# Patient Record
Sex: MALE | Race: WHITE | NOT HISPANIC OR LATINO
[De-identification: names, ages, dates, MRNs, and addresses within clinical notes are randomized per-mention and may not be internally consistent; named-entity substitution may affect disease eponyms.]

---

## 2017-05-02 ENCOUNTER — LABORATORY RESULT (OUTPATIENT)
Age: 37
End: 2017-05-02

## 2017-05-02 ENCOUNTER — APPOINTMENT (OUTPATIENT)
Dept: POPULATION HEALTH | Facility: CLINIC | Age: 37
End: 2017-05-02

## 2017-05-02 VITALS
WEIGHT: 169 LBS | HEIGHT: 69 IN | OXYGEN SATURATION: 100 % | HEART RATE: 50 BPM | SYSTOLIC BLOOD PRESSURE: 120 MMHG | DIASTOLIC BLOOD PRESSURE: 80 MMHG | TEMPERATURE: 97.5 F | RESPIRATION RATE: 15 BRPM | BODY MASS INDEX: 25.03 KG/M2

## 2017-05-02 DIAGNOSIS — Z77.098 CONTACT WITH AND (SUSPECTED) EXPOSURE TO OTHER HAZARDOUS, CHIEFLY NONMEDICINAL, CHEMICALS: ICD-10-CM

## 2017-05-02 DIAGNOSIS — I10 ESSENTIAL (PRIMARY) HYPERTENSION: ICD-10-CM

## 2017-05-02 DIAGNOSIS — L30.9 DERMATITIS, UNSPECIFIED: ICD-10-CM

## 2017-05-02 DIAGNOSIS — Z80.42 FAMILY HISTORY OF MALIGNANT NEOPLASM OF PROSTATE: ICD-10-CM

## 2017-05-02 RX ORDER — PROPRANOLOL HYDROCHLORIDE 40 MG/1
40 TABLET ORAL
Refills: 0 | Status: ACTIVE | COMMUNITY

## 2022-08-11 ENCOUNTER — APPOINTMENT (OUTPATIENT)
Dept: ORTHOPEDIC SURGERY | Facility: CLINIC | Age: 42
End: 2022-08-11

## 2022-08-11 VITALS — HEIGHT: 68 IN | BODY MASS INDEX: 25.01 KG/M2 | WEIGHT: 165 LBS

## 2022-08-11 DIAGNOSIS — Z78.9 OTHER SPECIFIED HEALTH STATUS: ICD-10-CM

## 2022-08-11 DIAGNOSIS — Z00.00 ENCOUNTER FOR GENERAL ADULT MEDICAL EXAMINATION W/OUT ABNORMAL FINDINGS: ICD-10-CM

## 2022-08-11 PROCEDURE — 73630 X-RAY EXAM OF FOOT: CPT | Mod: RT

## 2022-08-11 PROCEDURE — 99204 OFFICE O/P NEW MOD 45 MIN: CPT

## 2022-08-11 PROCEDURE — L4361: CPT | Mod: RT

## 2022-08-11 RX ORDER — NAPROXEN 500 MG/1
500 TABLET ORAL
Qty: 60 | Refills: 0 | Status: ACTIVE | COMMUNITY
Start: 2022-08-11 | End: 1900-01-01

## 2022-08-11 NOTE — ASSESSMENT
[FreeTextEntry1] : Due to chronicity and significant weakness with eversion, MRI RT ankle to evaluate peroneal tendon partial tear.\par WBAT in CAM boot.\par \par Patient was instructed that they can not operate an automatic vehicle while wearing a CAM boot or cast on the right lower extremity. If operating a vehicle that requires use of a clutch, patient may not drive while wearing a CAM boot or cast on the left lower extremity.\par \par Ice to affected area.\par \par The patient was explained the options as well as benefits of over the counter verses prescription strength nonsteroidal anti-inflammatory medication. The patient opts for a prescription strength medication.\par \par

## 2022-08-11 NOTE — PHYSICAL EXAM
[NL (40)] : plantar flexion 40 degrees [5___] : [facility] 5[unfilled]/5 [2+] : posterior tibialis pulse: 2+ [Normal] : saphenous nerve sensation normal [Right] : right foot [Weight -] : weightbearing [There are no fractures, subluxations or dislocations. No significant abnormalities are seen] : There are no fractures, subluxations or dislocations. No significant abnormalities are seen [4___] : eversion 4[unfilled]/5 [] : mildly antalgic [de-identified] : inversion 15 degrees [de-identified] : eversion 20 degrees [TWNoteComboBox7] : dorsiflexion 5 degrees

## 2022-08-11 NOTE — HISTORY OF PRESENT ILLNESS
[Result of repetitive motion] : result of repetitive motion [7] : 7 [5] : 5 [Burning] : burning [Shooting] : shooting [Stabbing] : stabbing [Full time] : Work status: full time [de-identified] : Pt. is a 41 year old male who presents for evaluation of his RT foot/ankle. Reports he felt severe pain while running Fall 2021. He has tried resting without significant relief. Ran last week and had pain. Has tried NSAIDS and ice to affected area. H/O LT peroneal brevis/longus tenosynovectomy with brevis repair and calcaneal osteotomy 2011 which is doing very well. WB in [facility]'s.  [] : no [FreeTextEntry1] : rt ankle [FreeTextEntry5] : 8 months, pt sprained ankle while running preparing for half marathon.  [FreeTextEntry9] : elevation and icing 45 min prior bed [de-identified] : running [de-identified] : Dr. Oropeza [de-identified] :  [de-identified] : from home

## 2022-08-19 ENCOUNTER — RESULT REVIEW (OUTPATIENT)
Age: 42
End: 2022-08-19

## 2022-09-01 ENCOUNTER — APPOINTMENT (OUTPATIENT)
Dept: ORTHOPEDIC SURGERY | Facility: CLINIC | Age: 42
End: 2022-09-01

## 2022-09-01 PROCEDURE — 99214 OFFICE O/P EST MOD 30 MIN: CPT

## 2022-09-01 NOTE — PHYSICAL EXAM
[NL (40)] : plantar flexion 40 degrees [4___] : eversion 4[unfilled]/5 [5___] : [facility] 5[unfilled]/5 [2+] : posterior tibialis pulse: 2+ [Normal] : saphenous nerve sensation normal [Right] : right foot [Weight -] : weightbearing [There are no fractures, subluxations or dislocations. No significant abnormalities are seen] : There are no fractures, subluxations or dislocations. No significant abnormalities are seen [] : no pain when stressing lateral tarsal metatarsal joint [de-identified] : inversion 15 degrees [de-identified] : eversion 20 degrees [TWNoteComboBox7] : dorsiflexion 5 degrees

## 2022-09-01 NOTE — ASSESSMENT
[FreeTextEntry1] : Discussed treatment options, both operative and non-operative.\par Surgically, he was discussed a peroneal tendon tenoylsis/tenosynovectomy with repair and calcaneal osteotomy.\par \par The risks and benefits of surgery have been discussed. Risks include but are not limited to bleeding, infection, reaction to anesthesia, injury to blood vessels and nerves, malunion, nonunion, necessity of repeat surgery, chronic pain, loss of limb and death. The patient understands the risks and agrees with the surgical plan. Details of the procedure and postoperative protocol were discussed at length. All questions have been answered.\par \par Pt. opts for surgery and will be scheduled at his convenience.

## 2022-09-01 NOTE — HISTORY OF PRESENT ILLNESS
[Result of repetitive motion] : result of repetitive motion [7] : 7 [5] : 5 [Burning] : burning [Shooting] : shooting [Stabbing] : stabbing [Full time] : Work status: full time [de-identified] : Pt. is a 41 year old male who presents for f/u of his RT foot/ankle. Reports he felt severe pain while running Fall 2021. He has tried resting without significant relief. Ran last week and had pain. Has tried NSAIDS and ice to affected area. H/O LT peroneal brevis/longus tenosynovectomy with brevis repair and calcaneal osteotomy 2011 which is doing very well. WB in CAM boot for three weeks without improvement. MRI was consistent with partial tear peroneus brevis. NSAIDS prn not helpful.  [] : no [FreeTextEntry1] : rt ankle [FreeTextEntry5] : 8 months, pt sprained ankle while running preparing for half marathon.  [FreeTextEntry9] : elevation and icing 45 min prior bed [de-identified] : running [de-identified] : Dr. Oropeza [de-identified] :  [de-identified] : from home

## 2022-09-01 NOTE — DATA REVIEWED
[MRI] : MRI [Right] : of the right [Ankle] : ankle [Report was reviewed and noted in the chart] : The report was reviewed and noted in the chart [I independently reviewed and interpreted images and report] : I independently reviewed and interpreted images and report [FreeTextEntry1] : Peroneus brevis split tear.

## 2022-10-06 ENCOUNTER — LABORATORY RESULT (OUTPATIENT)
Age: 42
End: 2022-10-06

## 2022-10-10 ENCOUNTER — RESULT REVIEW (OUTPATIENT)
Age: 42
End: 2022-10-10

## 2022-10-10 RX ORDER — HYDROCODONE BITARTRATE AND ACETAMINOPHEN 7.5; 325 MG/1; MG/1
7.5-325 TABLET ORAL
Qty: 42 | Refills: 0 | Status: ACTIVE | COMMUNITY
Start: 2022-10-10

## 2022-10-11 ENCOUNTER — APPOINTMENT (OUTPATIENT)
Age: 42
End: 2022-10-11

## 2022-10-11 PROCEDURE — 27658 REPAIR OF LEG TENDON EACH: CPT | Mod: 59,RT

## 2022-10-11 PROCEDURE — 27680 RELEASE OF LOWER LEG TENDON: CPT | Mod: 59,RT

## 2022-10-11 PROCEDURE — 28300 INCISION OF HEEL BONE: CPT | Mod: RT

## 2022-10-11 PROCEDURE — 29515 APPLICATION SHORT LEG SPLINT: CPT | Mod: 59,RT

## 2022-10-20 ENCOUNTER — APPOINTMENT (OUTPATIENT)
Dept: ORTHOPEDIC SURGERY | Facility: CLINIC | Age: 42
End: 2022-10-20
Payer: COMMERCIAL

## 2022-10-20 DIAGNOSIS — S86.311A STRAIN OF MUSCLE(S) AND TENDON(S) OF PERONEAL MUSCLE GROUP AT LOWER LEG LEVEL, RIGHT LEG, INITIAL ENCOUNTER: ICD-10-CM

## 2022-10-20 PROCEDURE — 99024 POSTOP FOLLOW-UP VISIT: CPT

## 2022-10-20 PROCEDURE — 29405 APPL SHORT LEG CAST: CPT | Mod: 58,RT

## 2022-10-20 PROCEDURE — 73650 X-RAY EXAM OF HEEL: CPT | Mod: RT

## 2022-10-20 NOTE — ASSESSMENT
"Ward Mckenzie is a 2 y.o. male here for a non-provider visit for:   FLU    Reason for immunization: Annual Flu Vaccine  Immunization records indicate need for vaccine: Yes, confirmed with Epic  Minimum interval has been met for this vaccine: Yes  ABN completed: No    VIS Dated  8/6/21 was given to patient: Yes  All IAC Questionnaire questions were answered \"No.\"    Patient tolerated injection and no adverse effects were observed or reported: Yes    Pt scheduled for next dose in series: No    " [FreeTextEntry1] : Sutures removed \par \par A well padded short leg cast was applied.  Patient was instructed on proper cast management including keeping it dry and not sticking anything down the cast.  Patient was told that if pain significantly increases or toes turn numb and/or blue and simple elevation does not allow symptoms to improve in a few minutes, to call the office immediately or go to the ER.  All questions were answered. \par \par Repeat x-ray (calcaneus) will be performed at the next office visit (out of cast).\par

## 2022-10-20 NOTE — HISTORY OF PRESENT ILLNESS
[Result of repetitive motion] : result of repetitive motion [7] : 7 [5] : 5 [Burning] : burning [Shooting] : shooting [Stabbing] : stabbing [Full time] : Work status: full time [de-identified] : Patient returns s/p Right Avendano calcaneal osteotomy, right peroneal brevis tenolysis, right peroneal brevis tendon repair 10/11/22.  he has been nwb in his splint. He reports only mild/intermittent pain at this time. [] : no [FreeTextEntry1] : rt ankle [FreeTextEntry5] : 8 months, pt sprained ankle while running preparing for half marathon.  [FreeTextEntry9] : elevation and icing 45 min prior bed [de-identified] : running [de-identified] : Dr. Oropeza [de-identified] :  [de-identified] : from home [de-identified] : 10/11/22 [de-identified] : Right Avendano calcaneal osteotomy, right peroneal brevis tenolysis, right peroneal brevis tendon repair

## 2022-10-20 NOTE — PHYSICAL EXAM
[Right] : right foot and ankle [4___] : eversion 4[unfilled]/5 [2+] : posterior tibialis pulse: 2+ [Normal] : saphenous nerve sensation normal [5___] : inversion 5[unfilled]/5 [] : no pain when stressing lateral tarsal metatarsal joint [de-identified] : plantar flexion 30 degrees [de-identified] : inversion 20 degrees [de-identified] : eversion 15 degrees [TWNoteComboBox7] : dorsiflexion 5 degrees

## 2022-11-10 ENCOUNTER — APPOINTMENT (OUTPATIENT)
Dept: ORTHOPEDIC SURGERY | Facility: CLINIC | Age: 42
End: 2022-11-10

## 2022-11-10 VITALS — HEIGHT: 68 IN | BODY MASS INDEX: 25.01 KG/M2 | WEIGHT: 165 LBS

## 2022-11-10 PROCEDURE — 99024 POSTOP FOLLOW-UP VISIT: CPT

## 2022-11-10 PROCEDURE — 73650 X-RAY EXAM OF HEEL: CPT | Mod: RT

## 2022-11-10 NOTE — ASSESSMENT
[FreeTextEntry1] : Patient is doing well.  He can now be wbat in the cam boot he has at home.  Home ROM exercises will start.  Rx for PT given.\par \par Repeat x-ray (calcaneus) will be performed at the next office visit (out of cast).\par

## 2022-11-10 NOTE — PHYSICAL EXAM
[5___] : dorsiflexion 5[unfilled]/5 [2+] : posterior tibialis pulse: 2+ [Normal] : saphenous nerve sensation normal [4___] : inversion 4[unfilled]/5 [Decreased] : sural nerve sensation decreased [] : ambulation with crutches [Right] : right calcaneus [No loss of surgical correlation. Bony alignment acceptable. Hardware in appropriate position] : No loss of surgical correlation. Bony alignment acceptable. Hardware in appropriate position [FreeTextEntry3] : Clean,dry eschars at incision sites, with no sign of infection. [de-identified] : Osteotomy nearly healed with no loss of alignment. [de-identified] : plantar flexion 30 degrees [de-identified] : inversion 20 degrees [de-identified] : eversion 15 degrees [TWNoteComboBox7] : dorsiflexion 10 degrees

## 2022-11-10 NOTE — REVIEW OF SYSTEMS
[Joint Pain] : joint pain [Negative] : Heme/Lymph [Joint Stiffness] : no joint stiffness [Joint Swelling] : no joint swelling [FreeTextEntry9] : itchy

## 2022-11-10 NOTE — HISTORY OF PRESENT ILLNESS
[Result of repetitive motion] : result of repetitive motion [Full time] : Work status: full time [0] : 0 [Localized] : localized [Tightness] : tightness [de-identified] : Patient returns s/p Right Avendano calcaneal osteotomy, right peroneal brevis tenolysis, right peroneal brevis tendon repair 10/11/22.  he has been nwb in his S[facility].  He reports no pain at this time. [] : no [FreeTextEntry1] : rt ankle [FreeTextEntry5] : 8 months, pt sprained ankle while running preparing for half marathon.  [FreeTextEntry9] : elevation and icing 45 min prior bed [de-identified] : running [de-identified] : 10-11-22 [de-identified] :  [de-identified] : from home [de-identified] : 10/11/22 [de-identified] : Right Avendano calcaneal osteotomy, right peroneal brevis tenolysis, right peroneal brevis tendon repair

## 2022-12-08 ENCOUNTER — APPOINTMENT (OUTPATIENT)
Dept: ORTHOPEDIC SURGERY | Facility: CLINIC | Age: 42
End: 2022-12-08

## 2022-12-08 VITALS — WEIGHT: 165 LBS | BODY MASS INDEX: 25.01 KG/M2 | HEIGHT: 68 IN

## 2022-12-08 PROCEDURE — 99024 POSTOP FOLLOW-UP VISIT: CPT

## 2022-12-08 PROCEDURE — 73650 X-RAY EXAM OF HEEL: CPT | Mod: RT

## 2022-12-08 NOTE — ASSESSMENT
[FreeTextEntry1] : D/C CAM boot.\par WBAT in supportive footwear. \par Continue with PT and home exercises.\par No high impact activities.

## 2022-12-08 NOTE — HISTORY OF PRESENT ILLNESS
[Result of repetitive motion] : result of repetitive motion [0] : 0 [Localized] : localized [Full time] : Work status: full time [Occasional] : occasional [Physical therapy] : physical therapy [de-identified] : Patient returns s/p Right Avendano calcaneal osteotomy, right peroneal brevis tenolysis, right peroneal brevis tendon repair 10/11/22. He has been WBAT in CAM boot. Doing well. He has been going to PT and doing HEP. He feels balanced when bearing weight and reports his right foot feels like his left foot.  [] : no [FreeTextEntry1] : rt foot/ ankle [FreeTextEntry5] : 1 year ago, pt sprained ankle while running preparing for half marathon.  [FreeTextEntry6] : mild soreness in the AM  [FreeTextEntry9] : elevation and icing 45 min prior bed, HEP  [de-identified] : 10-11-22 [de-identified] : Physical therapy  [de-identified] :  [de-identified] : from home [de-identified] : 10/11/22 [de-identified] : Right Avendano calcaneal osteotomy, right peroneal brevis tenolysis, right peroneal brevis tendon repair

## 2022-12-08 NOTE — PHYSICAL EXAM
[4___] : eversion 4[unfilled]/5 [2+] : posterior tibialis pulse: 2+ [Decreased] : sural nerve sensation decreased [Normal] : saphenous nerve sensation normal [Right] : right calcaneus [No loss of surgical correlation. Bony alignment acceptable. Hardware in appropriate position] : No loss of surgical correlation. Bony alignment acceptable. Hardware in appropriate position [NL (40)] : plantar flexion 40 degrees [NL 30)] : inversion 30 degrees [NL (20)] : eversion 20 degrees [5___] : inversion 5[unfilled]/5 [] : no pain when stressing lateral tarsal metatarsal joint [FreeTextEntry3] : Minimal lateral ankle swelling.\par  [FreeTextEntry9] : ROM is symmetrical.  [de-identified] : WB in CAM boot.  [de-identified] : Osteotomy well healed with no loss of alignment. [de-identified] : plantar flexion 30 degrees [de-identified] : inversion 20 degrees [de-identified] : eversion 15 degrees [TWNoteComboBox7] : dorsiflexion 10 degrees

## 2023-01-05 ENCOUNTER — APPOINTMENT (OUTPATIENT)
Dept: ORTHOPEDIC SURGERY | Facility: CLINIC | Age: 43
End: 2023-01-05
Payer: COMMERCIAL

## 2023-01-05 PROCEDURE — 73610 X-RAY EXAM OF ANKLE: CPT | Mod: RT

## 2023-01-05 PROCEDURE — 99024 POSTOP FOLLOW-UP VISIT: CPT

## 2023-01-05 NOTE — HISTORY OF PRESENT ILLNESS
[Result of repetitive motion] : result of repetitive motion [0] : 0 [Localized] : localized [Occasional] : occasional [Physical therapy] : physical therapy [Full time] : Work status: full time [de-identified] : Patient returns s/p Right Avendano calcaneal osteotomy, right peroneal brevis tenolysis, right peroneal brevis tendon repair 10/11/22. He has been going to PT and doing HEP. He was doing very well until  late December when while getting into the shower he felt as though he reinjured the same ankle. Put himself back in the boot, used crutches for several days.  He reports posterolateral ankle pain (above the level of his lateral hindfoot scar), but no heel pain. Present WB in [facility] today with limp.   [] : no [FreeTextEntry1] : rt foot/ ankle [FreeTextEntry5] : 1 year ago, pt sprained ankle while running preparing for half marathon.  [FreeTextEntry6] : mild soreness in the AM  [FreeTextEntry9] : elevation and icing 45 min prior bed, HEP  [de-identified] : 10-11-22 [de-identified] : Physical therapy  [de-identified] :  [de-identified] : from home [de-identified] : 10/11/22 [de-identified] : Right Avendano calcaneal osteotomy, right peroneal brevis tenolysis, right peroneal brevis tendon repair

## 2023-01-05 NOTE — ASSESSMENT
[FreeTextEntry1] : Patient's symptoms are more proximal in the peroneal tendons compared to the surgical site.\par MRI right ankle ordered to evaluate the peroneal tendons.\par He was advised to be wbat in his cam boot that he has at home.\par Ice/nsaids prn.

## 2023-01-05 NOTE — PHYSICAL EXAM
[NL (40)] : plantar flexion 40 degrees [NL 30)] : inversion 30 degrees [NL (20)] : eversion 20 degrees [2+] : posterior tibialis pulse: 2+ [Decreased] : sural nerve sensation decreased [Normal] : saphenous nerve sensation normal [5___] : dorsiflexion 5[unfilled]/5 [4___] : plantar flexion 4[unfilled]/5 [] : no pain when stressing lateral tarsal metatarsal joint [Right] : right ankle [There are no fractures, subluxations or dislocations. No significant abnormalities are seen] : There are no fractures, subluxations or dislocations. No significant abnormalities are seen [Weight -] : weightbearing [de-identified] : WB in CAM boot.  [FreeTextEntry9] : Calcaneal osteotomy well healed with no loss of alignment. [TWNoteComboBox7] : dorsiflexion 10 degrees

## 2023-01-06 ENCOUNTER — RESULT REVIEW (OUTPATIENT)
Age: 43
End: 2023-01-06

## 2023-01-09 ENCOUNTER — NON-APPOINTMENT (OUTPATIENT)
Age: 43
End: 2023-01-09

## 2023-01-12 ENCOUNTER — APPOINTMENT (OUTPATIENT)
Dept: ORTHOPEDIC SURGERY | Facility: CLINIC | Age: 43
End: 2023-01-12
Payer: COMMERCIAL

## 2023-01-12 VITALS — HEIGHT: 68 IN | BODY MASS INDEX: 25.01 KG/M2 | WEIGHT: 165 LBS

## 2023-01-12 PROCEDURE — 99214 OFFICE O/P EST MOD 30 MIN: CPT

## 2023-01-12 NOTE — PHYSICAL EXAM
[Right] : right foot and ankle [NL (40)] : plantar flexion 40 degrees [4___] : plantar flexion 4[unfilled]/5 [5___] : inversion 5[unfilled]/5 [3___] : eversion 3[unfilled]/5 [2+] : posterior tibialis pulse: 2+ [Decreased] : sural nerve sensation decreased [Normal] : saphenous nerve sensation normal [] : no pain when stressing lateral tarsal metatarsal joint [FreeTextEntry9] : ROM is symmetrical.  [de-identified] : WB in CAM boot.  [de-identified] : inversion 10 degrees [de-identified] : eversion 10 degrees [TWNoteComboBox7] : dorsiflexion 10 degrees

## 2023-01-12 NOTE — ASSESSMENT
[FreeTextEntry1] : Discussed treatment options, both operative and non-operative. \par Surgery is the recommended treatment and he is in agreement.\par \par Discussed RT peroneal repair (with allograft) vs. tendon transfer).\par  \par The risks and benefits of surgery have been discussed. Risks include but are not limited to bleeding, infection, reaction to anesthesia, injury to blood vessels and nerves, malunion, nonunion, necessity of repeat surgery, chronic pain, DVT, PE, loss of limb and death. The patient understands the risks and agrees with the surgical plan. Details of the procedure and postoperative protocol were discussed at length. All questions have been answered.\par \par Continue with CAM boot for comfort. \par

## 2023-01-12 NOTE — DATA REVIEWED
[MRI] : MRI [Right] : of the right [Foot] : foot [Report was reviewed and noted in the chart] : The report was reviewed and noted in the chart [I independently reviewed and interpreted images and report] : I independently reviewed and interpreted images and report [FreeTextEntry1] : [facility] 1/6/23: Status post calcaneal osteotomy with incomplete healing. There is some reactive\par signal alteration surrounding the fixation screws. The findings may reflect\par recent postoperative change. A follow-up examination is recommended to assess\par for complete bony union.\par \par Complete tear of peroneus brevis tendon at the level of distal calcaneus with\par 1.5 cm gap. Proximal and distal segments demonstrate tendinosis associated with\par postoperative changes.\par \par Posterior subtalar joint effusion.

## 2023-01-12 NOTE — HISTORY OF PRESENT ILLNESS
[Dull/Aching] : dull/aching [Sharp] : sharp [Stabbing] : stabbing [Throbbing] : throbbing [Nothing helps with pain getting better] : Nothing helps with pain getting better [Full time] : Work status: full time [Result of repetitive motion] : result of repetitive motion [0] : 0 [Localized] : localized [Occasional] : occasional [Physical therapy] : physical therapy [de-identified] : Patient returns s/p Right Avendano calcaneal osteotomy, right peroneal brevis tenolysis, right peroneal brevis tendon repair 10/11/22. He has been going to PT and doing HEP. He was doing very well until  late December when while getting into the shower he felt as though he reinjured the same ankle. He continues to WB in CAM boot, went for updated MRI which reveals rupture of peroneus brevis tendon.  [] : no [FreeTextEntry1] : rt foot/ ankle [FreeTextEntry5] : 1 year ago, pt sprained ankle while running preparing for half marathon.  [FreeTextEntry6] : mild soreness in the AM  [FreeTextEntry9] : elevation and icing 45 min prior bed, HEP  [de-identified] : PM with no boot [de-identified] : 10-11-22 [de-identified] : Physical therapy  [de-identified] :  [de-identified] : from home [de-identified] : 10/11/22 [de-identified] : Right Avendano calcaneal osteotomy, right peroneal brevis tenolysis, right peroneal brevis tendon repair

## 2023-01-13 ENCOUNTER — LABORATORY RESULT (OUTPATIENT)
Age: 43
End: 2023-01-13

## 2023-01-16 RX ORDER — HYDROCODONE BITARTRATE AND ACETAMINOPHEN 7.5; 325 MG/1; MG/1
7.5-325 TABLET ORAL
Qty: 42 | Refills: 0 | Status: ACTIVE | COMMUNITY
Start: 2023-01-16

## 2023-01-17 ENCOUNTER — APPOINTMENT (OUTPATIENT)
Age: 43
End: 2023-01-17
Payer: COMMERCIAL

## 2023-01-17 ENCOUNTER — RESULT REVIEW (OUTPATIENT)
Age: 43
End: 2023-01-17

## 2023-01-17 PROCEDURE — 27691 REVISE LOWER LEG TENDON: CPT | Mod: RT

## 2023-01-17 PROCEDURE — 29515 APPLICATION SHORT LEG SPLINT: CPT | Mod: 59,RT

## 2023-01-26 ENCOUNTER — APPOINTMENT (OUTPATIENT)
Dept: ORTHOPEDIC SURGERY | Facility: CLINIC | Age: 43
End: 2023-01-26
Payer: COMMERCIAL

## 2023-01-26 VITALS — WEIGHT: 165 LBS | HEIGHT: 68 IN | BODY MASS INDEX: 25.01 KG/M2

## 2023-01-26 PROCEDURE — 29515 APPLICATION SHORT LEG SPLINT: CPT

## 2023-01-26 PROCEDURE — 99024 POSTOP FOLLOW-UP VISIT: CPT

## 2023-01-26 NOTE — HISTORY OF PRESENT ILLNESS
[Result of repetitive motion] : result of repetitive motion [1] : 2 [Dull/Aching] : dull/aching [Localized] : localized [Throbbing] : throbbing [Occasional] : occasional [Sleep] : sleep [Physical therapy] : physical therapy [Nothing helps with pain getting better] : Nothing helps with pain getting better [Lying in bed] : lying in bed [Full time] : Work status: full time [de-identified] : Patient returns s/p Right Avendano calcaneal osteotomy, right peroneal brevis tenolysis, right peroneal brevis tendon repair 10/11/22. He has been going to PT and doing HEP. He was doing very well until  late December when while getting into the shower he felt as though he reinjured the same ankle. He went for updated MRI which revealed rupture of peroneus brevis tendon. Most recently he is s/p RT peroneal brevis to longus tendon transfer 1/17/23. He has been NWB in splint. Post-op pain well managed. No fevers, chills, sweats.  [] : no [FreeTextEntry1] : rt foot/ ankle [FreeTextEntry5] : 1 year ago, pt sprained ankle while running preparing for half marathon.  [FreeTextEntry6] : mild soreness in the AM  [FreeTextEntry9] : elevation and icing 45 min prior bed, HEP  [de-identified] : PM with no boot [de-identified] : 1-17-23 [de-identified] :  [de-identified] : from home [de-identified] : 1-17-23

## 2023-01-26 NOTE — PHYSICAL EXAM
[Right] : right foot and ankle [NL (40)] : plantar flexion 40 degrees [4___] : plantar flexion 4[unfilled]/5 [5___] : inversion 5[unfilled]/5 [3___] : eversion 3[unfilled]/5 [2+] : posterior tibialis pulse: 2+ [Decreased] : sural nerve sensation decreased [Normal] : saphenous nerve sensation normal [] : ambulation with crutches [FreeTextEntry8] : Mild tenderness over incision.  [FreeTextEntry9] : There is active eversion.  [de-identified] : inversion 15 degrees [de-identified] : eversion 15 degrees [TWNoteComboBox7] : dorsiflexion 5 degrees

## 2023-01-26 NOTE — ASSESSMENT
[FreeTextEntry1] : Sutures removed.\par Dressing applied.\par \par NWB in CAM boot.\par \par Patient was instructed that they can not operate an automatic vehicle while wearing a CAM boot or cast on the right lower extremity. If operating a vehicle that requires use of a clutch, patient may not drive while wearing a CAM boot or cast on the left lower extremity.\par \par Several times a day, he will do NWB ankle ROM exercises, focusing on eversion. \par \par Will place in splint, can transition into boot he has at home.

## 2023-02-07 NOTE — REVIEW OF SYSTEMS
Detail Level: Detailed [Joint Pain] : joint pain [Negative] : Heme/Lymph [Joint Stiffness] : joint stiffness [Joint Swelling] : joint swelling

## 2023-02-23 ENCOUNTER — APPOINTMENT (OUTPATIENT)
Dept: ORTHOPEDIC SURGERY | Facility: CLINIC | Age: 43
End: 2023-02-23
Payer: COMMERCIAL

## 2023-02-23 PROCEDURE — 99024 POSTOP FOLLOW-UP VISIT: CPT

## 2023-02-23 NOTE — PHYSICAL EXAM
[Right] : right foot and ankle [2+] : posterior tibialis pulse: 2+ [Decreased] : sural nerve sensation decreased [Normal] : saphenous nerve sensation normal [5___] : plantar flexion 5[unfilled]/5 [4___] : eversion 4[unfilled]/5 [] : no pain when stressing lateral tarsal metatarsal joint [FreeTextEntry3] : Small pinpoint opening proximal aspect of incision, no erythema or drainage noted.  [FreeTextEntry8] : Mild tenderness over incision.  [FreeTextEntry9] : There is active eversion.  [de-identified] : plantar flexion 30 degrees [de-identified] : inversion 10 degrees [de-identified] : eversion 15 degrees [TWNoteComboBox7] : dorsiflexion 15 degrees

## 2023-02-23 NOTE — HISTORY OF PRESENT ILLNESS
[Result of repetitive motion] : result of repetitive motion [Dull/Aching] : dull/aching [Localized] : localized [Throbbing] : throbbing [Occasional] : occasional [Sleep] : sleep [Physical therapy] : physical therapy [Nothing helps with pain getting better] : Nothing helps with pain getting better [Lying in bed] : lying in bed [Full time] : Work status: full time [2] : 2 [0] : 0 [de-identified] : Patient returns s/p Right Avendano calcaneal osteotomy, right peroneal brevis tenolysis, right peroneal brevis tendon repair 10/11/22. He has been going to PT and doing HEP. He was doing very well until  late December when while getting into the shower he felt as though he reinjured the same ankle. He went for updated MRI which revealed rupture of peroneus brevis tendon. Most recently he is s/p RT peroneal brevis to longus tendon transfer 1/17/23. He has been NWB in CAM boot. Doing well.   [] : no [FreeTextEntry1] : rt foot/ ankle [FreeTextEntry5] : 1 year ago, pt sprained ankle while running preparing for half marathon.  [FreeTextEntry6] : mild soreness in the AM  [FreeTextEntry9] : elevation and icing 45 min prior bed, HEP  [de-identified] : PM with no boot [de-identified] : 1-17-23 [de-identified] : cam boot , crutches [de-identified] :  [de-identified] : from home [de-identified] : 1-17-23

## 2023-02-23 NOTE — ASSESSMENT
[FreeTextEntry1] : NWB in CAM boot for one more week and then can transition to WB in CAM boot.\par \par Patient was instructed that they can not operate an automatic vehicle while wearing a CAM boot or cast on the right lower extremity. If operating a vehicle that requires use of a clutch, patient may not drive while wearing a CAM boot or cast on the left lower extremity.\par \par Begin PT in one week. no passive inversion.\par Ice to affected area.\par

## 2023-04-13 ENCOUNTER — APPOINTMENT (OUTPATIENT)
Dept: ORTHOPEDIC SURGERY | Facility: CLINIC | Age: 43
End: 2023-04-13
Payer: COMMERCIAL

## 2023-04-13 VITALS — BODY MASS INDEX: 25.01 KG/M2 | HEIGHT: 68 IN | WEIGHT: 165 LBS

## 2023-04-13 PROCEDURE — 99024 POSTOP FOLLOW-UP VISIT: CPT

## 2023-04-13 PROCEDURE — L1902: CPT | Mod: RT

## 2023-04-13 NOTE — ASSESSMENT
[FreeTextEntry1] : D/C CAM boot.\par WBAT in supportive sneaker, ASO brace.\par Continue PT.\par NSAIDS, ice, activity modification.

## 2023-04-13 NOTE — PHYSICAL EXAM
[Right] : right foot and ankle [5___] : inversion 5[unfilled]/5 [2+] : posterior tibialis pulse: 2+ [Decreased] : sural nerve sensation decreased [Normal] : saphenous nerve sensation normal [NL (40)] : plantar flexion 40 degrees [NL 30)] : inversion 30 degrees [NL (20)] : eversion 20 degrees [4___] : plantar flexion 4[unfilled]/5 [] : pain when isolating peroneal longus tendon [FreeTextEntry3] : Minimal lateral ankle swelling.\par  [de-identified] : WB in CAM boot.  [de-identified] : plantar flexion 30 degrees [de-identified] : inversion 10 degrees [de-identified] : eversion 15 degrees [TWNoteComboBox7] : dorsiflexion 15 degrees

## 2023-04-13 NOTE — HISTORY OF PRESENT ILLNESS
[Result of repetitive motion] : result of repetitive motion [2] : 2 [0] : 0 [Burning] : burning [Radiating] : radiating [Shooting] : shooting [Occasional] : occasional [Sleep] : sleep [Physical therapy] : physical therapy [Nothing helps with pain getting better] : Nothing helps with pain getting better [Lying in bed] : lying in bed [Full time] : Work status: full time [de-identified] : Patient returns s/p Right Avendano calcaneal osteotomy, right peroneal brevis tenolysis, right peroneal brevis tendon repair 10/11/22. He has been going to PT and doing HEP. He was doing very well until  late December when while getting into the shower he felt as though he reinjured the same ankle. He went for updated MRI which revealed rupture of peroneus brevis tendon. Most recently he is s/p RT peroneal brevis to longus tendon transfer 1/17/23. He has been WB in CAM boot, doing PT. He reports continued improvement, there is still some intermittent lateral ankle pain.  [] : no [FreeTextEntry1] : rt foot/ ankle [FreeTextEntry5] : 1 year ago, pt sprained ankle while running preparing for half marathon.  [FreeTextEntry9] : elevation and icing 45 min prior bed, HEP  [de-identified] : standing with no boot [de-identified] : 1-17-23 [de-identified] : cam boot helping [de-identified] :  [de-identified] : from home [de-identified] : 1-17-23

## 2023-04-13 NOTE — REVIEW OF SYSTEMS
[Joint Pain] : joint pain [Joint Swelling] : joint swelling [Negative] : Heme/Lymph [Joint Stiffness] : no joint stiffness [FreeTextEntry9] : nerve pain

## 2023-05-17 ENCOUNTER — APPOINTMENT (OUTPATIENT)
Dept: ORTHOPEDIC SURGERY | Facility: CLINIC | Age: 43
End: 2023-05-17
Payer: COMMERCIAL

## 2023-05-17 VITALS — WEIGHT: 170 LBS | HEIGHT: 68 IN | BODY MASS INDEX: 25.76 KG/M2

## 2023-05-17 PROCEDURE — 99214 OFFICE O/P EST MOD 30 MIN: CPT

## 2023-05-17 RX ORDER — METHYLPREDNISOLONE 4 MG/1
4 TABLET ORAL
Qty: 1 | Refills: 1 | Status: ACTIVE | COMMUNITY
Start: 2023-05-17 | End: 1900-01-01

## 2023-05-17 NOTE — PHYSICAL EXAM
[Right] : right foot and ankle [NL (40)] : plantar flexion 40 degrees [NL 30)] : inversion 30 degrees [NL (20)] : eversion 20 degrees [5___] : inversion 5[unfilled]/5 [4___] : eversion 4[unfilled]/5 [2+] : posterior tibialis pulse: 2+ [Decreased] : sural nerve sensation decreased [Normal] : saphenous nerve sensation normal [] : no pain when stressing lateral tarsal metatarsal joint [FreeTextEntry3] : Pea sized nodule at dorsolateral forefoot.  Minimally tender. [de-identified] : Pain with resisted eversion. [TWNoteComboBox7] : dorsiflexion 15 degrees

## 2023-05-17 NOTE — REVIEW OF SYSTEMS
[Joint Pain] : joint pain [Joint Swelling] : joint swelling [Negative] : Heme/Lymph [Joint Stiffness] : joint stiffness [FreeTextEntry9] : nerve pain

## 2023-05-17 NOTE — HISTORY OF PRESENT ILLNESS
[Result of repetitive motion] : result of repetitive motion [Burning] : burning [Shooting] : shooting [Sleep] : sleep [Nothing helps with pain getting better] : Nothing helps with pain getting better [Lying in bed] : lying in bed [Full time] : Work status: full time [10] : 10 [7] : 7 [Dull/Aching] : dull/aching [Radiating] : radiating [Sharp] : sharp [Constant] : constant [Meds] : meds [Ice] : ice [Exercising] : exercising [de-identified] : Patient returns s/p Right Avendano calcaneal osteotomy, right peroneal brevis tenolysis, right peroneal brevis tendon repair 10/11/22. In December 2022 he sustained a rupture of peroneus brevis tendon. He is s/p RT peroneal brevis to longus tendon transfer 1/17/23. He had been wb in sneakers and was going to PT and had been doing well.   He reports that on 5/5/23 he developed pain and swelling.  No new trauma.  Since then he has been having intermittent swelling, but has had pain every day. [] : no [FreeTextEntry1] : rt foot/ ankle [FreeTextEntry5] : 1 year ago, pt sprained ankle while running preparing for half marathon. Pt states that the pain has gotten worse since the last visit. Has gone to PT 2-3x per week, but has stopped. Feels it is not helping. Notes pain while laying in bed.  [FreeTextEntry7] : R foot [FreeTextEntry9] : elevation and icing 45 min prior bed, HEP  [de-identified] : 1-17-23 [de-identified] : cam boot helping with crutches [de-identified] :  [de-identified] : from home [de-identified] : 1-17-23

## 2023-05-17 NOTE — ASSESSMENT
[FreeTextEntry1] : Patient seems to be having a flare of inflammation.\par MDP prescribed.\par He can use his cam boot as needed.\par The nodule appears unrelated and will be observed for now.

## 2023-05-25 ENCOUNTER — APPOINTMENT (OUTPATIENT)
Dept: ORTHOPEDIC SURGERY | Facility: CLINIC | Age: 43
End: 2023-05-25
Payer: COMMERCIAL

## 2023-05-25 ENCOUNTER — RESULT REVIEW (OUTPATIENT)
Age: 43
End: 2023-05-25

## 2023-05-25 VITALS — BODY MASS INDEX: 25.76 KG/M2 | WEIGHT: 170 LBS | HEIGHT: 68 IN

## 2023-05-25 PROCEDURE — 99213 OFFICE O/P EST LOW 20 MIN: CPT

## 2023-05-25 PROCEDURE — 73630 X-RAY EXAM OF FOOT: CPT | Mod: RT

## 2023-05-25 NOTE — HISTORY OF PRESENT ILLNESS
[Result of repetitive motion] : result of repetitive motion [Dull/Aching] : dull/aching [Radiating] : radiating [Sharp] : sharp [Constant] : constant [Sleep] : sleep [Meds] : meds [Ice] : ice [Nothing helps with pain getting better] : Nothing helps with pain getting better [Full time] : Work status: full time [7] : 7 [1] : 2 [Walking] : walking [de-identified] : Patient returns s/p Right Avendano calcaneal osteotomy, right peroneal brevis tenolysis, right peroneal brevis tendon repair 10/11/22. In December 2022 he sustained a rupture of peroneus brevis tendon. He is s/p RT peroneal brevis to longus tendon transfer 1/17/23. He had been wb in sneakers and was going to PT and had been doing well.   He reports that on 5/5/23 he developed pain and swelling.  Since last visit he took the MDP which significantly helped his swelling, but pain with walking still persists. [] : no [FreeTextEntry1] : rt foot/ ankle [FreeTextEntry5] : 1 year ago, pt sprained ankle while running preparing for half marathon. Pt states that the pain has gotten worse since the last visit. Has gone to PT 2-3x per week, but has stopped. Feels it is not helping. Notes pain while laying in bed.  [FreeTextEntry7] : R foot/ankle [FreeTextEntry9] : MDP [de-identified] : 1-17-23 [de-identified] :  [de-identified] : from home [de-identified] : 1-17-23

## 2023-05-25 NOTE — PHYSICAL EXAM
[Right] : right foot and ankle [NL (40)] : plantar flexion 40 degrees [NL 30)] : inversion 30 degrees [NL (20)] : eversion 20 degrees [5___] : inversion 5[unfilled]/5 [4___] : eversion 4[unfilled]/5 [2+] : posterior tibialis pulse: 2+ [Decreased] : sural nerve sensation decreased [Normal] : saphenous nerve sensation normal [] : no pain when stressing lateral tarsal metatarsal joint [FreeTextEntry3] :  Minimal lateral marilyn swelling. Pea sized nodule at dorsolateral forefoot.  Minimally tender. [FreeTextEntry8] : Mild tenderness along peroneals in lateral hindfoot. [de-identified] : Pain with resisted eversion. [TWNoteComboBox7] : dorsiflexion 15 degrees

## 2023-05-25 NOTE — ASSESSMENT
[FreeTextEntry1] : Patient is improved from last week, but still has pain.\par MRI ankle ordered to evaluate for loss of the tenodesis distally and/or a fluid collection or just inflammation.\par Pending the mri results, further treatment will be recommended.

## 2023-06-01 ENCOUNTER — APPOINTMENT (OUTPATIENT)
Dept: ORTHOPEDIC SURGERY | Facility: CLINIC | Age: 43
End: 2023-06-01
Payer: COMMERCIAL

## 2023-06-01 PROCEDURE — 20550 NJX 1 TENDON SHEATH/LIGAMENT: CPT

## 2023-06-01 PROCEDURE — J3490M: CUSTOM

## 2023-06-01 PROCEDURE — 99214 OFFICE O/P EST MOD 30 MIN: CPT | Mod: 25

## 2023-06-01 PROCEDURE — 76942 ECHO GUIDE FOR BIOPSY: CPT

## 2023-06-01 RX ORDER — TRAMADOL HYDROCHLORIDE 50 MG/1
50 TABLET, COATED ORAL
Qty: 30 | Refills: 0 | Status: ACTIVE | COMMUNITY
Start: 2023-06-01 | End: 1900-01-01

## 2023-06-01 NOTE — PROCEDURE
[FreeTextEntry3] : Patient has tried OTC's including aspirin, Ibuprofen, Aleve, etc or prescription NSAIDS, and/or exercises at home and/or physical therapy without satisfactory response and the risks benefits, and alternatives have been discussed, and verbal consent was obtained.\par \par The risks, benefits and contents of the injection have been discussed.  Risks include but are not limited to allergic reaction, flare reaction, permanent white skin discoloration at the injection site and infection.  The patient understands the risks and agrees to having the injection.  All questions have been answered.\par \par An injection of the peroneal tendon sheath was performed. The indication for this procedure was pain and inflammation. The site was prepped with alcohol and sterile technique used. An injection of 1cc of Lidocaine 1% , 1cc of Ropivacaine  0.5% ,and 1cc of 80mg Methylprednisolone (Depomedrol) was used. Patient tolerated procedure well. Patient was advised to call if redness, pain, or fever occur, apply ice for 15 minutes out of every hour for the next 12-24 hours as tolerated and patient was advised to rest the joint(s) for 3 days.\par \par Ultrasound guidance was indicated for this patient due to ensure placement in the tendon sheath. All ultrasound images have been permanently captured and stored accordingly in our picture archiving and communication system. Visualization of the needle and placement of injection was performed without complication.\par

## 2023-06-01 NOTE — PHYSICAL EXAM
[Right] : right foot and ankle [NL (40)] : plantar flexion 40 degrees [NL 30)] : inversion 30 degrees [5___] : inversion 5[unfilled]/5 [4___] : eversion 4[unfilled]/5 [2+] : posterior tibialis pulse: 2+ [Decreased] : sural nerve sensation decreased [Normal] : saphenous nerve sensation normal [] : no pain when stressing lateral tarsal metatarsal joint [FreeTextEntry3] : Mild ateral ankle swelling. Pea sized nodule at dorsolateral forefoot.  Minimally tender. [FreeTextEntry8] : Tenderness along peroneals in lateral hindfoot. [de-identified] : Pain with resisted eversion. [de-identified] : eversion 15 degrees [TWNoteComboBox7] : dorsiflexion 15 degrees

## 2023-06-01 NOTE — DATA REVIEWED
[MRI] : MRI [Right] : of the right [Ankle] : ankle [Report was reviewed and noted in the chart] : The report was reviewed and noted in the chart [I independently reviewed and interpreted images and report] : I independently reviewed and interpreted images and report [I reviewed the films/CD and agree] : I reviewed the films/CD and agree [FreeTextEntry1] : Postsurgical changes of peroneus brevis tear repair with tenodesis to peroneus longus with prominent postsurgical change and suture artifact at the level of distal calcaneus. At this level there is poor definition of tendon fibers which may also reflect postsurgical change or at least partial tearing. There is no apparent full-thickness tendon gap. Extensive scarring extends further distal in the expected region of peroneus brevis to fifth metatarsal attachment with moderate overlying edema.  Status post calcaneal osteotomy with near complete healing.  Posterior subtalar joint effusion.

## 2023-06-01 NOTE — ASSESSMENT
[FreeTextEntry1] : Patient was offered a steroid injection and would like to proceed.\par Tramadol sent for pain.\par Icing encouraged.\par Cam boot recommended.\par

## 2023-06-01 NOTE — HISTORY OF PRESENT ILLNESS
[Result of repetitive motion] : result of repetitive motion [Radiating] : radiating [Sharp] : sharp [Constant] : constant [Sleep] : sleep [Meds] : meds [Ice] : ice [Nothing helps with pain getting better] : Nothing helps with pain getting better [Walking] : walking [Full time] : Work status: full time [10] : 10 [9] : 9 [Shooting] : shooting [Throbbing] : throbbing [de-identified] : Patient returns s/p Right Avendano calcaneal osteotomy, right peroneal brevis tenolysis, right peroneal brevis tendon repair 10/11/22. In December 2022 he sustained a rupture of peroneus brevis tendon. He is s/p RT peroneal brevis to longus tendon transfer 1/17/23. On 5/5/23 he developed pain and swelling which has been persistent.   MDP  helped his swelling, but pain with walking still persists.  Updated MRI showed that the tenodesis is intact. [] : no [FreeTextEntry1] : rt foot/ ankle [FreeTextEntry5] : 1 year ago, pt sprained ankle while running preparing for half marathon. Pt states that the pain has gotten worse since the last visit. Has gone to PT 2-3x per week, but has stopped. Feels it is not helping. Notes pain while laying in bed.  [FreeTextEntry7] : R foot/ankle [FreeTextEntry9] : MDP [de-identified] : 1-17-23 [de-identified] :  [de-identified] : from home [de-identified] : 1-17-23

## 2023-06-22 ENCOUNTER — APPOINTMENT (OUTPATIENT)
Dept: ORTHOPEDIC SURGERY | Facility: CLINIC | Age: 43
End: 2023-06-22
Payer: COMMERCIAL

## 2023-06-22 VITALS — WEIGHT: 170 LBS | BODY MASS INDEX: 25.76 KG/M2 | HEIGHT: 68 IN

## 2023-06-22 DIAGNOSIS — M76.71 PERONEAL TENDINITIS, RIGHT LEG: ICD-10-CM

## 2023-06-22 PROCEDURE — 99213 OFFICE O/P EST LOW 20 MIN: CPT

## 2023-06-22 NOTE — ASSESSMENT
[FreeTextEntry1] : D/C CAM boot.\par WBAT in supportive sneaker with ankle brace.\par Ankle ROM exercises.\par No high impact activities.

## 2023-06-22 NOTE — PHYSICAL EXAM
[Right] : right foot and ankle [NL (40)] : plantar flexion 40 degrees [4___] : eversion 4[unfilled]/5 [2+] : posterior tibialis pulse: 2+ [Decreased] : sural nerve sensation decreased [Normal] : saphenous nerve sensation normal [5___] : plantar flexion 5[unfilled]/5 [] : no pain when isolating peroneal longus tendon [FreeTextEntry3] : Minimal lateral ankle swelling.  Pea sized nodule at dorsolateral forefoot.  Minimally tender. [de-identified] : WB in CAM boot.  [de-identified] : inversion 20 degrees [de-identified] : eversion 15 degrees [TWNoteComboBox7] : dorsiflexion 10 degrees

## 2023-06-22 NOTE — HISTORY OF PRESENT ILLNESS
Pt picked up Rx for Adderall 7.5mg on 5/22/19. [Result of repetitive motion] : result of repetitive motion [2] : 2 [Radiating] : radiating [Throbbing] : throbbing [Constant] : constant [Sleep] : sleep [Rest] : rest [Ice] : ice [Nothing helps with pain getting better] : Nothing helps with pain getting better [Walking] : walking [Full time] : Work status: full time [de-identified] : Patient returns s/p Right Avendano calcaneal osteotomy, right peroneal brevis tenolysis, right peroneal brevis tendon repair 10/11/22. In December 2022 he sustained a rupture of peroneus brevis tendon. He is s/p RT peroneal brevis to longus tendon transfer 1/17/23. On 5/5/23 he developed pain and swelling which has been persistent. Medrol dose pack was somewhat helpful. Updated MRI showed that the tenodesis is intact. Steroid injection on 6/1/23 with relief. Tramadol at night. He has been consistently WB in CAM boot.  [] : Post Surgical Visit: no [FreeTextEntry1] : rt foot/ ankle [FreeTextEntry5] : 1 year ago, pt sprained ankle while running preparing for half marathon. Pt states that the pain has gotten worse since the last visit. Has gone to PT 2-3x per week, but has stopped. Feels it is not helping. Notes pain while laying in bed.  [FreeTextEntry7] : R foot/ankle [FreeTextEntry9] : tall cam boot [de-identified] : 1-17-23 [de-identified] :  [de-identified] : from home [de-identified] : 1-17-23

## 2023-07-13 ENCOUNTER — APPOINTMENT (OUTPATIENT)
Dept: ORTHOPEDIC SURGERY | Facility: CLINIC | Age: 43
End: 2023-07-13
Payer: COMMERCIAL

## 2023-07-13 VITALS — HEIGHT: 68 IN | WEIGHT: 170 LBS | BODY MASS INDEX: 25.76 KG/M2

## 2023-07-13 DIAGNOSIS — S86.311D STRAIN OF MUSCLE(S) AND TENDON(S) OF PERONEAL MUSCLE GROUP AT LOWER LEG LEVEL, RIGHT LEG, SUBSEQUENT ENCOUNTER: ICD-10-CM

## 2023-07-13 DIAGNOSIS — Z98.890 OTHER SPECIFIED POSTPROCEDURAL STATES: ICD-10-CM

## 2023-07-13 PROCEDURE — 99213 OFFICE O/P EST LOW 20 MIN: CPT

## 2023-07-13 NOTE — HISTORY OF PRESENT ILLNESS
[Result of repetitive motion] : result of repetitive motion [0] : 0 [Localized] : localized [Throbbing] : throbbing [Constant] : constant [Sleep] : sleep [Rest] : rest [Ice] : ice [Nothing helps with pain getting better] : Nothing helps with pain getting better [Walking] : walking [Full time] : Work status: full time [de-identified] : Patient returns s/p Right Avendano calcaneal osteotomy, right peroneal brevis tenolysis, right peroneal brevis tendon repair 10/11/22. In December 2022 he sustained a rupture of peroneus brevis tendon. He is s/p RT peroneal brevis to longus tendon transfer 1/17/23. On 5/5/23 he developed pain and swelling which had been persistent. Medrol dose pack was somewhat helpful. Updated MRI showed that the tenodesis is intact. Steroid injection on 6/1/23 with relief. He has weaned from brace, Reports doing very well, some stiffness but no pain at this time. He wears a compression sleeve prn.  [] : Post Surgical Visit: no [FreeTextEntry1] : rt  ankle [FreeTextEntry5] : 1 year ago, pt sprained ankle while running preparing for half marathon. Pt states that the pain has gotten worse since the last visit. Has gone to PT 2-3x per week, but has stopped. Feels it is not helping. Notes pain while laying in bed.  [FreeTextEntry9] : HEP [de-identified] : 1-17-23 [de-identified] :  [de-identified] : from home [de-identified] : 1-17-23

## 2023-07-13 NOTE — PHYSICAL EXAM
[Right] : right foot and ankle [NL (40)] : plantar flexion 40 degrees [5___] : inversion 5[unfilled]/5 [4___] : eversion 4[unfilled]/5 [2+] : posterior tibialis pulse: 2+ [Decreased] : sural nerve sensation decreased [Normal] : saphenous nerve sensation normal [NL 30)] : inversion 30 degrees [NL (20)] : eversion 20 degrees [] : patient ambulates without assistive device [FreeTextEntry3] : Minimal lateral ankle swelling.  Pea sized nodule at dorsolateral forefoot.  Minimally tender. [de-identified] : inversion 20 degrees [de-identified] : eversion 15 degrees [TWNoteComboBox7] : dorsiflexion 10 degrees

## 2023-07-13 NOTE — REASON FOR VISIT
[Spouse] : spouse [FreeTextEntry2] : Follow up: right ankle pain Muscle Hinge Flap Text: The defect edges were debeveled with a #15 scalpel blade.  Given the size, depth and location of the defect and the proximity to free margins a muscle hinge flap was deemed most appropriate.  Using a sterile surgical marker, an appropriate hinge flap was drawn incorporating the defect. The area thus outlined was incised with a #15 scalpel blade.  The skin margins were undermined to an appropriate distance in all directions utilizing iris scissors.

## 2023-07-13 NOTE — ASSESSMENT
[FreeTextEntry1] : WBAT in supportive sneaker, neoprene sleeve prn.\par Ankle ROM exercises.\par Activity modification discussed.